# Patient Record
Sex: MALE | Race: WHITE
[De-identification: names, ages, dates, MRNs, and addresses within clinical notes are randomized per-mention and may not be internally consistent; named-entity substitution may affect disease eponyms.]

---

## 2019-02-28 ENCOUNTER — HOSPITAL ENCOUNTER (EMERGENCY)
Dept: HOSPITAL 59 - ER | Age: 19
Discharge: HOME | End: 2019-02-28
Payer: SELF-PAY
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
## 2019-02-28 DIAGNOSIS — T15.82XA: Primary | ICD-10-CM

## 2019-02-28 DIAGNOSIS — S05.02XA: ICD-10-CM

## 2019-02-28 DIAGNOSIS — F17.210: ICD-10-CM

## 2019-02-28 PROCEDURE — 99283 EMERGENCY DEPT VISIT LOW MDM: CPT

## 2019-02-28 PROCEDURE — 65205 REMOVE FOREIGN BODY FROM EYE: CPT

## 2019-02-28 NOTE — EMERGENCY DEPARTMENT RECORD
History of Present Illness





- General


Chief complaint: Eye Problem


Stated complaint: METAL IN LEFT EYE


Time Seen by Provider: 19 09:48


Source: Patient


Mode of Arrival: Ambulatory


Limitations: No limitations


Travel/Exposure to [place] Within 21 Days of Symptoms: No





- History of Present Illness


Initial comments: 


The patient is here due to L eye pain for 1-2 hours. He was grinding metal at 

work and did have safety glasses on and felt like something got into the L eye. 

He did flush the eye out but since the FB sensation did not resolve he was told 

to come to the ER. The patient denies any visual changes and states his Td is 

UTD.





MD chief complaint: Eye pain


Onset/Timin


-: Hour(s)


Onset Description: Sudden


Location: Left eye


Place: Work


If Injury: Occurred while hammering/grinding


Eye Symptoms: Pain


Severity scale (1-10): 6


If Pain, Quality: Aching


Consistency: Constant


Associated Symptoms: None


Treatments Prior to Arrival: Irrigated eye





- Related Data


Visual acuity (L) = 20/: 25


Visual acuity (R) = 20/: 20


With correction: No


Hx Tetanus Toxoid Vaccination: No


Patient Tetanus UTD (within 5 yrs): No


 Home Medications











 Medication  Instructions  Recorded  Confirmed  Last Taken


 


No Home Med [NO HOME MEDS]  19 Unknown











 Allergies











Allergy/AdvReac Type Severity Reaction Status Date / Time


 


No Known Drug Allergies Allergy Unknown  Verified 19 09:58





[NO KNOWN DRUG ALLERGIES]     














Travel Screening





- Travel/Exposure Within Last 30 Days


Have you traveled within the last 30 days?: No





Review of Systems


Constitutional: Denies: Chills, Fever, Other


Eyes: Reports: Eye pain


ENT: Denies: Congestion


Respiratory: Denies: Cough, Dyspnea





Past Medical History





- SOCIAL HISTORY


Smoking Status: Current every day smoker


Alcohol Use: None


Drug Use: None





- RESPIRATORY


Hx Respiratory Disorders: No





- CARDIOVASCULAR


Hx Cardio Disorders: No





- NEURO


Hx Neuro Disorders: No





- GI


Hx GI Disorders: No





- 


Hx Genitourinary Disorders: No





- ENDOCRINE


Hx Endocrine Disorders: No





- MUSCULOSKELETAL


Hx Musculoskeletal Disorders: No





- PSYCH


Hx Psych Problems: No





- HEMATOLOGY/ONCOLOGY


Hx Hematology/Oncology Disorders: No





Family Medical History


Any Significant Family History?: No





Physical Exam





- General


General Appearance: Alert, Oriented x3, Cooperative, No acute distress





- Head


Head exam: Atraumatic, Normocephalic, Normal inspection





- Eye


Eye exam: Normal appearance, PERRL, Conjunctival injection (Mild L eye.), EOMI, 

Other (There was a small FB removed from the L upper eyelid with a Qtip after 

lid eversion. There is a very tiny 1 mm abrasion at the 4:00 position of the L 

cornea on flourescein staining. ).  negative: Periorbital swelling, Periorbital 

tenderness


Visual acuity (L) = 20/: 25


Visual acuity (R) = 20/: 20


With correction: No





Course





 Vital Signs











  19





  09:54


 


Temperature 97.9 F


 


Pulse Rate 71


 


Respiratory 20





Rate 


 


Blood Pressure 132/74


 


Pulse Ox 98














- Reevaluation(s)


Reevaluation #1: 


I did discuss the need for the eye ointment for 3 days. He is to return to the 

ER in 2 days if not completely better.


19 10:18








Disposition


Disposition: Discharge


Clinical Impression: 


Abrasion of eye


Qualifiers:


 Encounter type: initial encounter Laterality: left Qualified Code(s): S05.8X2A 

- Other injuries of left eye and orbit, initial encounter





Disposition: Home, Self-Care


Condition: (2) Stable


Instructions:  Corneal Abrasion (ED)


Additional Instructions: 


Please use Tylenol or Motrin for pain and use the Emycin eye ointment 4 times a 

day in the L eye for 3 days. Please return to the ER if not 100% better in 2 

days.


Forms:  Patient Portal Access


Time of Disposition: 10:20





Quality





- Quality Measures


Quality Measures: N/A





- Blood Pressure Screening


View Details: Yes


Does Patient Have Any of the Following: No


Blood Pressure Classification: Pre-Hypertensive BP Reading


Systolic Measurement: 132


Diastolic Measurement: 74


Screening for High Blood Pressure: < Pre-Hypertensive BP, F/U Documented > [

]


Pre-Hypertensive Follow-up Interventions: Referral to alternative/primary care 

provider.